# Patient Record
Sex: FEMALE | Race: WHITE | URBAN - METROPOLITAN AREA
[De-identification: names, ages, dates, MRNs, and addresses within clinical notes are randomized per-mention and may not be internally consistent; named-entity substitution may affect disease eponyms.]

---

## 2024-09-29 NOTE — PROGRESS NOTES
Office Note: Establish Care  10/4/2024  Patient Name: Alfreda Maloney  MRN: [id number] : 1995    SUBJECTIVE:     CHIEF COMPLAINT:  Chief Complaint   Patient presents with    Establish Care     No concerns today.      HISTORY OF PRESENT ILLNESS:  Patient is a 29 y.o. female with a PMH of migraines, pituitary microadenoma who presents today to establish care.    Preventive Care  - Last pap smear:  - due  - Menstrual cycles: Kyleena IUD - no cycles; depo previously in 2019  - Last mammogram: N/A  - Last colon cancer screen: N/A  - Needed vaccines: flu, covid  - Diet/Exercise: could be better  - Dentist: not up to date   - Eye Doctor: doesn't need one  - Tobacco Use: N/A   Lung Cancer Screen? N/A  - Depression Screen: completed today     Migraines: improved after switching to progesterone only birth control    Pituitary microadenoma - followed with neurology for awhile. Has had elevated PRL levels without any discharge. CTs and MRIs have been done with routine follow up and unless patient becomes symptomatic in the future, just recommend routine surveillance.     Past Medical History:  No past medical history on file.  Past Surgical History:  No past surgical history on file.  Medications:  Current Outpatient Medications   Medication Sig Dispense Refill    Levonorgestrel (KYLEENA) IUD 19.5 mg        No current facility-administered medications for this visit.     Allergies:  No Known Allergies  Social History:  Social History     Tobacco Use    Smoking status: Never    Smokeless tobacco: Never   Substance Use Topics    Alcohol use: Yes     Comment: weekly    Drug use: Yes     Types: Marijuana (Weed)        ROS and PHYSICAL:   ROS:  10 point ROS otherwise negative except as mentioned in the HPI    VITALS:  Vitals:    10/04/24 0744   BP: 106/72   Site: Left Upper Arm   Position: Sitting   Cuff Size: Medium Adult   Pulse: 83   Temp: 97.7 °F (36.5 °C)   TempSrc: Infrared   SpO2: 99%   Weight: 62.2 kg (137

## 2024-10-03 PROBLEM — D35.2 PITUITARY MICROADENOMA (HCC): Status: ACTIVE | Noted: 2019-06-01

## 2024-10-03 RX ORDER — LEVONORGESTREL 19.5 MG/1
INTRAUTERINE DEVICE INTRAUTERINE
COMMUNITY
Start: 2021-05-03

## 2024-10-03 SDOH — HEALTH STABILITY: PHYSICAL HEALTH: ON AVERAGE, HOW MANY DAYS PER WEEK DO YOU ENGAGE IN MODERATE TO STRENUOUS EXERCISE (LIKE A BRISK WALK)?: 1 DAY

## 2024-10-03 SDOH — HEALTH STABILITY: PHYSICAL HEALTH: ON AVERAGE, HOW MANY MINUTES DO YOU ENGAGE IN EXERCISE AT THIS LEVEL?: 40 MIN

## 2024-10-04 ENCOUNTER — OFFICE VISIT (OUTPATIENT)
Dept: FAMILY MEDICINE CLINIC | Age: 29
End: 2024-10-04

## 2024-10-04 VITALS
SYSTOLIC BLOOD PRESSURE: 106 MMHG | DIASTOLIC BLOOD PRESSURE: 72 MMHG | WEIGHT: 137.2 LBS | OXYGEN SATURATION: 99 % | HEART RATE: 83 BPM | BODY MASS INDEX: 22.05 KG/M2 | TEMPERATURE: 97.7 F | HEIGHT: 66 IN

## 2024-10-04 DIAGNOSIS — Z11.51 SCREENING FOR HPV (HUMAN PAPILLOMAVIRUS): ICD-10-CM

## 2024-10-04 DIAGNOSIS — Z00.00 ANNUAL PHYSICAL EXAM: ICD-10-CM

## 2024-10-04 DIAGNOSIS — Z97.5 IUD (INTRAUTERINE DEVICE) IN PLACE: ICD-10-CM

## 2024-10-04 DIAGNOSIS — G43.909 MIGRAINE WITHOUT STATUS MIGRAINOSUS, NOT INTRACTABLE, UNSPECIFIED MIGRAINE TYPE: ICD-10-CM

## 2024-10-04 DIAGNOSIS — Z71.82 EXERCISE COUNSELING: ICD-10-CM

## 2024-10-04 DIAGNOSIS — Z12.4 SCREENING FOR CERVICAL CANCER: ICD-10-CM

## 2024-10-04 DIAGNOSIS — D35.2 PITUITARY MICROADENOMA (HCC): ICD-10-CM

## 2024-10-04 DIAGNOSIS — Z71.3 DIETARY COUNSELING: ICD-10-CM

## 2024-10-04 DIAGNOSIS — Z76.89 ENCOUNTER TO ESTABLISH CARE: Primary | ICD-10-CM

## 2024-10-04 LAB
ALBUMIN SERPL-MCNC: 4.6 G/DL (ref 3.4–5)
ALBUMIN/GLOB SERPL: 1.8 {RATIO} (ref 1.1–2.2)
ALP SERPL-CCNC: 58 U/L (ref 40–129)
ALT SERPL-CCNC: 14 U/L (ref 10–40)
ANION GAP SERPL CALCULATED.3IONS-SCNC: 12 MMOL/L (ref 3–16)
AST SERPL-CCNC: 17 U/L (ref 15–37)
BILIRUB SERPL-MCNC: 0.3 MG/DL (ref 0–1)
BUN SERPL-MCNC: 11 MG/DL (ref 7–20)
CALCIUM SERPL-MCNC: 9.8 MG/DL (ref 8.3–10.6)
CHLORIDE SERPL-SCNC: 104 MMOL/L (ref 99–110)
CHOLEST SERPL-MCNC: 188 MG/DL (ref 0–199)
CO2 SERPL-SCNC: 26 MMOL/L (ref 21–32)
CREAT SERPL-MCNC: 0.7 MG/DL (ref 0.6–1.1)
DEPRECATED RDW RBC AUTO: 14.1 % (ref 12.4–15.4)
EST. AVERAGE GLUCOSE BLD GHB EST-MCNC: 88.2 MG/DL
GFR SERPLBLD CREATININE-BSD FMLA CKD-EPI: >90 ML/MIN/{1.73_M2}
GLUCOSE SERPL-MCNC: 85 MG/DL (ref 70–99)
HBA1C MFR BLD: 4.7 %
HCT VFR BLD AUTO: 49 % (ref 36–48)
HDLC SERPL-MCNC: 59 MG/DL (ref 40–60)
HGB BLD-MCNC: 16.5 G/DL (ref 12–16)
LDL CHOLESTEROL: 116 MG/DL
MCH RBC QN AUTO: 31.3 PG (ref 26–34)
MCHC RBC AUTO-ENTMCNC: 33.8 G/DL (ref 31–36)
MCV RBC AUTO: 92.7 FL (ref 80–100)
PLATELET # BLD AUTO: 266 K/UL (ref 135–450)
PMV BLD AUTO: 9.6 FL (ref 5–10.5)
POTASSIUM SERPL-SCNC: 4.6 MMOL/L (ref 3.5–5.1)
PROT SERPL-MCNC: 7.1 G/DL (ref 6.4–8.2)
RBC # BLD AUTO: 5.28 M/UL (ref 4–5.2)
SODIUM SERPL-SCNC: 142 MMOL/L (ref 136–145)
TRIGL SERPL-MCNC: 64 MG/DL (ref 0–150)
TSH SERPL DL<=0.005 MIU/L-ACNC: 0.78 UIU/ML (ref 0.27–4.2)
VLDLC SERPL CALC-MCNC: 13 MG/DL
WBC # BLD AUTO: 6.8 K/UL (ref 4–11)

## 2024-10-04 SDOH — ECONOMIC STABILITY: FOOD INSECURITY: WITHIN THE PAST 12 MONTHS, YOU WORRIED THAT YOUR FOOD WOULD RUN OUT BEFORE YOU GOT MONEY TO BUY MORE.: NEVER TRUE

## 2024-10-04 SDOH — ECONOMIC STABILITY: FOOD INSECURITY: WITHIN THE PAST 12 MONTHS, THE FOOD YOU BOUGHT JUST DIDN'T LAST AND YOU DIDN'T HAVE MONEY TO GET MORE.: NEVER TRUE

## 2024-10-04 SDOH — ECONOMIC STABILITY: INCOME INSECURITY: HOW HARD IS IT FOR YOU TO PAY FOR THE VERY BASICS LIKE FOOD, HOUSING, MEDICAL CARE, AND HEATING?: NOT HARD AT ALL

## 2024-10-04 ASSESSMENT — PATIENT HEALTH QUESTIONNAIRE - PHQ9
SUM OF ALL RESPONSES TO PHQ QUESTIONS 1-9: 0
1. LITTLE INTEREST OR PLEASURE IN DOING THINGS: NOT AT ALL
SUM OF ALL RESPONSES TO PHQ QUESTIONS 1-9: 0
SUM OF ALL RESPONSES TO PHQ QUESTIONS 1-9: 0
2. FEELING DOWN, DEPRESSED OR HOPELESS: NOT AT ALL
SUM OF ALL RESPONSES TO PHQ QUESTIONS 1-9: 0
SUM OF ALL RESPONSES TO PHQ9 QUESTIONS 1 & 2: 0

## 2024-10-04 NOTE — PATIENT INSTRUCTIONS
[facility] - [address], [place] [address] (located behind [facility])  - No appointment necessary  - Open to the public Monday-Friday 7:30a-4:30p  - Phone number: [phone]    [facility]   [address] [place] [address] [address], [place] [address]  - Can make an appointment online, but walk-ins also welcome  - Hours Monday-Friday 8:00a-4:30p (lunch 12:30p-1:30p)  - Hours Saturday 8:30a-12:00p or if COVID symptoms 2:00p-4:00p  - Phone number: [phone]      **Please note these are the two closest labs to our office. Feel free to go to any other [facility] lab site or [facility]. You can also go to "[employer]" or [email or web address] labs as well. Have any lab send the results to my office either electronically or via fax.    [facility]  Dr. Alfreda Gutierrez  [address] [facility], [place] [address]  Phone: [phone]  Fax: [phone]      Dentist    Melvin Morgan, DDS  [address] [facility] [facility], [place] [address] [phone]    [facility] [place]  LEE Saini, LEE  [address] [place] [address] [phone]    Davon Rojas, DDS  [address], [place] [address] [phone]

## 2024-10-08 LAB
HPV HR 12 DNA SPEC QL NAA+PROBE: DETECTED
HPV16 DNA SPEC QL NAA+PROBE: NOT DETECTED
HPV16+18+H RISK 12 DNA SPEC-IMP: ABNORMAL
HPV18 DNA SPEC QL NAA+PROBE: NOT DETECTED

## 2024-10-23 ENCOUNTER — PATIENT MESSAGE (OUTPATIENT)
Dept: FAMILY MEDICINE CLINIC | Age: 29
End: 2024-10-23

## 2024-10-23 DIAGNOSIS — R87.618 ABNORMAL PAPANICOLAOU SMEAR OF CERVIX WITH POSITIVE HUMAN PAPILLOMA VIRUS (HPV) TEST: Primary | ICD-10-CM

## 2025-08-01 ENCOUNTER — TELEMEDICINE ON DEMAND (OUTPATIENT)
Age: 30
End: 2025-08-01
Payer: COMMERCIAL

## 2025-08-01 DIAGNOSIS — L03.115 CELLULITIS OF RIGHT FOOT: ICD-10-CM

## 2025-08-01 DIAGNOSIS — T63.441A BEE STING, ACCIDENTAL OR UNINTENTIONAL, INITIAL ENCOUNTER: Primary | ICD-10-CM

## 2025-08-01 PROCEDURE — 99213 OFFICE O/P EST LOW 20 MIN: CPT | Performed by: NURSE PRACTITIONER

## 2025-08-01 RX ORDER — DICLOXACILLIN SODIUM 500 MG/1
500 CAPSULE ORAL 4 TIMES DAILY
Qty: 20 CAPSULE | Refills: 0 | Status: SHIPPED | OUTPATIENT
Start: 2025-08-01 | End: 2025-08-06

## 2025-08-01 RX ORDER — MUPIROCIN 2 %
OINTMENT (GRAM) TOPICAL
Qty: 15 G | Refills: 0 | Status: SHIPPED | OUTPATIENT
Start: 2025-08-01 | End: 2025-08-08

## 2025-08-01 ASSESSMENT — ENCOUNTER SYMPTOMS
SHORTNESS OF BREATH: 0
GASTROINTESTINAL NEGATIVE: 1
COLOR CHANGE: 1
WHEEZING: 0
RESPIRATORY NEGATIVE: 1

## 2025-08-01 NOTE — PROGRESS NOTES
[facility] Primary [facility] Virtualist Encounter Note       Chief Complaint   Patient presents with    Insect Bite     Stung by bee on R foot x10 days ago, area still red, swollen, itchy.        HPI:    Alfreda Maloney (:  1995) has requested an audio/video evaluation for the following concern(s):    This is an established patient of Dr. Gutierrez's. This is the first time I am seeing the patient today. She requested this visit for a bee sting to her right foot that happened 10 days ago and has worsened. She is sure the stinger was removed. Top of right foot is swollen and red and redness over the last couple days has spread \"OUT\"  towards toes and to inside of foot and it is itchy. This morning she noticed a couple tiny weeping blister that have now popped. She has not used antihistamine or used anything on it. Denies fever/chills. No N/V/D. No other discoloration. No numbness or tingling.  Being worsening feel that it is not just specific to LLR but cellulitis due to length from bee sting and spreading of redness and swelling with tenderness.    Review of Systems   Constitutional:  Negative for chills and fever.   HENT: Negative.     Respiratory: Negative.  Negative for shortness of breath and wheezing.    Cardiovascular:  Negative for chest pain and palpitations.   Gastrointestinal: Negative.    Genitourinary: Negative.    Musculoskeletal:  Positive for myalgias (right foot on top).   Skin:  Positive for color change (redness and some bruising noted to top of right foot post bee sting) and wound (top of right foot bee sting).   Neurological:  Negative for dizziness, weakness, numbness and headaches.       Prior to Visit Medications    Medication Sig Taking? Authorizing Provider   dicloxacillin (DYNAPEN) 500 MG capsule Take 1 capsule by mouth 4 times daily for 5 days Yes Sarahi Rowan, APRN - CNP   mupirocin (BACTROBAN) 2 % ointment Apply topically 2 times daily to affected area. Yes Anum

## 2025-08-01 NOTE — PATIENT INSTRUCTIONS
Notify office if you have no improvement or worsening of condition.   Apply ice 3-4 times a day and elevate 3-4 times a day and when sitting.  Take medication as prescribed.  Wash area affected 2 times a day with dial antibacterial soap and apply thin layer of mupirocin to affected area as directed and you can apply sock if you would like.  Follow up with PCP in 3 days if not improving. Or ED if worsening: ie, increased pain, fever, increased swelling, skin color change, redness is spreading, etc.  Please review educational handouts.

## 2025-08-06 ENCOUNTER — CLINICAL SUPPORT (OUTPATIENT)
Dept: FAMILY MEDICINE CLINIC | Age: 30
End: 2025-08-06
Payer: COMMERCIAL

## 2025-08-06 DIAGNOSIS — Z23 NEED FOR VACCINATION: Primary | ICD-10-CM

## 2025-08-06 PROCEDURE — 90471 IMMUNIZATION ADMIN: CPT | Performed by: NURSE PRACTITIONER

## 2025-08-06 PROCEDURE — 90651 9VHPV VACCINE 2/3 DOSE IM: CPT | Performed by: NURSE PRACTITIONER
